# Patient Record
Sex: FEMALE | Race: WHITE | NOT HISPANIC OR LATINO | Employment: FULL TIME | URBAN - METROPOLITAN AREA
[De-identification: names, ages, dates, MRNs, and addresses within clinical notes are randomized per-mention and may not be internally consistent; named-entity substitution may affect disease eponyms.]

---

## 2021-06-25 DIAGNOSIS — Z11.59 ENCOUNTER FOR SCREENING FOR OTHER VIRAL DISEASES: ICD-10-CM

## 2021-06-26 ENCOUNTER — HOSPITAL ENCOUNTER (OUTPATIENT)
Dept: LAB | Facility: CLINIC | Age: 66
Discharge: HOME | End: 2021-06-26
Attending: COLON & RECTAL SURGERY | Admitting: COLON & RECTAL SURGERY
Payer: COMMERCIAL

## 2021-06-26 DIAGNOSIS — Z11.59 ENCOUNTER FOR SCREENING FOR OTHER VIRAL DISEASES: ICD-10-CM

## 2021-06-26 LAB
SARS-COV-2 RNA RESP QL NAA+PROBE: NORMAL
SPECIMEN SOURCE: NORMAL

## 2021-06-26 PROCEDURE — U0003 INFECTIOUS AGENT DETECTION BY NUCLEIC ACID (DNA OR RNA); SEVERE ACUTE RESPIRATORY SYNDROME CORONAVIRUS 2 (SARS-COV-2) (CORONAVIRUS DISEASE [COVID-19]), AMPLIFIED PROBE TECHNIQUE, MAKING USE OF HIGH THROUGHPUT TECHNOLOGIES AS DESCRIBED BY CMS-2020-01-R: HCPCS | Performed by: COLON & RECTAL SURGERY

## 2021-06-26 PROCEDURE — U0005 INFEC AGEN DETEC AMPLI PROBE: HCPCS | Performed by: COLON & RECTAL SURGERY

## 2021-06-27 LAB
LABORATORY COMMENT REPORT: NORMAL
SARS-COV-2 RNA RESP QL NAA+PROBE: NEGATIVE
SPECIMEN SOURCE: NORMAL

## 2021-06-30 ENCOUNTER — HOSPITAL ENCOUNTER (OUTPATIENT)
Facility: CLINIC | Age: 66
Discharge: HOME | End: 2021-06-30
Attending: COLON & RECTAL SURGERY | Admitting: COLON & RECTAL SURGERY
Payer: COMMERCIAL

## 2021-06-30 NOTE — CONSULTS
[facility] Consultation by Colon and Rectal Surgery    Linda Pickard MRN# [id number]   Age: 65 year old YOB: 1955     Date of Admission:  6/30/2021    Reason for consult: Anal pain, hemorrhoids       Requesting physician: LUIS       Level of consult: One-time consult to assist in determining a diagnosis and to recommend an appropriate treatment plan           Assessment and Plan:   Assessment:   Resolving Thrombosed External Hemorrhoid      Plan:   66 y/o woman with a resolving right posterolateral thrombosed external hemorrhoid.  We discussed the etiology of hemorrhoids and their medical and surgical management.  Unfortunately what Linda has is not amenable to hemorrhoid banding.  We are only able to band internal hemorrhoids, not external.  I did not even do an internal exam today because the likelihood of the internal hemorrhoids causing pain is low - this is almost certainly the JESSICA.  I told her I actually do not recommend anything interventionally until the JESSICA has resolved as it can then makes it difficult to discern post-intervention issues if they were to arise.  The only intervention I would have to offer Linda at this point is surgery to excise the hemorrhoid, but that would essentially just restart her cycle of pain.  I told her that this would resolve with time, but it could take weeks.  I encouraged her to be on a high fiber diet, avoid constipation/diarrhea and use her topical ointments that she has been previously prescribed.  She does not need to follow up again unless she has further questions/concerns.  I also told her if she were to follow up to see one of my partners that actually practices here in the [facility] so she would have better continuity of care.             Chief Complaint:   Anal Pain     History is obtained from the patient         History of Present Illness:    65 year old woman presenting with perianal pain for the past month.  She reports that the  pain started when she had a bout of constipation when some of her diabetes meds were changed.  She has not been back on metformin and the stools are looser now.  During that bout of constipation she had a lot of pain and felt a swelling/lump near her anus.  It burns and bleeds.  It is sensitive to the touch when she wipes after a bowel movement.  She has seen her primary and GI and has been told that she would be a good candidate for hemorrhoid banding.             Review of Systems:   The Review of Systems is negative other than noted in the HPI          Physical Exam:   Vitals were reviewed  All vitals stable  Constitutional:   A+Ox3, NAD   Rectal: Resolving JESSICA in right posterolateral quadrant.  No fluctuance.  Tender to touch.  Anoscopic exam deferred due to JESSICA       Attestation:  Amount of time performed on this consult: 30 minutes.    Osiel Lyman MD ....................  6/30/2021   3:18 PM  Colon and Rectal Surgery Staff  [phone]

## 2021-06-30 NOTE — OR NURSING
Patient tolerated office exam visit by . Unable to do banding procedure at this time. See provider note for further detail.

## 2022-02-20 ENCOUNTER — APPOINTMENT (OUTPATIENT)
Dept: ULTRASOUND IMAGING | Facility: CLINIC | Age: 67
End: 2022-02-20
Attending: EMERGENCY MEDICINE
Payer: COMMERCIAL

## 2022-02-20 ENCOUNTER — HOSPITAL ENCOUNTER (EMERGENCY)
Facility: CLINIC | Age: 67
Discharge: HOME | End: 2022-02-20
Attending: EMERGENCY MEDICINE | Admitting: EMERGENCY MEDICINE
Payer: COMMERCIAL

## 2022-02-20 VITALS
HEIGHT: 60 IN | TEMPERATURE: 98.4 F | RESPIRATION RATE: 16 BRPM | HEART RATE: 77 BPM | OXYGEN SATURATION: 99 % | WEIGHT: 165 LBS | DIASTOLIC BLOOD PRESSURE: 69 MMHG | SYSTOLIC BLOOD PRESSURE: 123 MMHG | BODY MASS INDEX: 32.39 KG/M2

## 2022-02-20 DIAGNOSIS — L02.619 CELLULITIS AND ABSCESS OF FOOT EXCLUDING TOE: ICD-10-CM

## 2022-02-20 DIAGNOSIS — L03.119 CELLULITIS AND ABSCESS OF FOOT EXCLUDING TOE: ICD-10-CM

## 2022-02-20 DIAGNOSIS — R60.0 BILATERAL EDEMA OF LOWER EXTREMITY: ICD-10-CM

## 2022-02-20 LAB
ALBUMIN SERPL-MCNC: 3.2 G/DL (ref 3.4–5)
ALP SERPL-CCNC: 114 U/L (ref 40–150)
ALT SERPL W P-5'-P-CCNC: 25 U/L (ref 0–50)
ANION GAP SERPL CALCULATED.3IONS-SCNC: 4 MMOL/L (ref 3–14)
AST SERPL W P-5'-P-CCNC: 16 U/L (ref 0–45)
BASOPHILS # BLD AUTO: 0.1 10E3/UL (ref 0–0.2)
BASOPHILS NFR BLD AUTO: 1 %
BILIRUB SERPL-MCNC: 0.3 MG/DL (ref 0.2–1.3)
BUN SERPL-MCNC: 35 MG/DL (ref 7–30)
CALCIUM SERPL-MCNC: 10.3 MG/DL (ref 8.5–10.1)
CHLORIDE BLD-SCNC: 101 MMOL/L (ref 94–109)
CO2 SERPL-SCNC: 30 MMOL/L (ref 20–32)
CREAT SERPL-MCNC: 1.1 MG/DL (ref 0.52–1.04)
D DIMER PPP FEU-MCNC: 2.23 UG/ML FEU (ref 0–0.5)
EOSINOPHIL # BLD AUTO: 0.6 10E3/UL (ref 0–0.7)
EOSINOPHIL NFR BLD AUTO: 6 %
ERYTHROCYTE [DISTWIDTH] IN BLOOD BY AUTOMATED COUNT: 16.2 % (ref 10–15)
GFR SERPL CREATININE-BSD FRML MDRD: 56 ML/MIN/1.73M2
GLUCOSE BLD-MCNC: 236 MG/DL (ref 70–99)
HCT VFR BLD AUTO: 34.8 % (ref 35–47)
HGB BLD-MCNC: 10.4 G/DL (ref 11.7–15.7)
IMM GRANULOCYTES # BLD: 0.1 10E3/UL
IMM GRANULOCYTES NFR BLD: 1 %
LYMPHOCYTES # BLD AUTO: 2.2 10E3/UL (ref 0.8–5.3)
LYMPHOCYTES NFR BLD AUTO: 20 %
MCH RBC QN AUTO: 23.7 PG (ref 26.5–33)
MCHC RBC AUTO-ENTMCNC: 29.9 G/DL (ref 31.5–36.5)
MCV RBC AUTO: 79 FL (ref 78–100)
MONOCYTES # BLD AUTO: 0.8 10E3/UL (ref 0–1.3)
MONOCYTES NFR BLD AUTO: 7 %
NEUTROPHILS # BLD AUTO: 7.3 10E3/UL (ref 1.6–8.3)
NEUTROPHILS NFR BLD AUTO: 65 %
NRBC # BLD AUTO: 0 10E3/UL
NRBC BLD AUTO-RTO: 0 /100
NT-PROBNP SERPL-MCNC: 34 PG/ML (ref 0–900)
PLATELET # BLD AUTO: 245 10E3/UL (ref 150–450)
POTASSIUM BLD-SCNC: 4.6 MMOL/L (ref 3.4–5.3)
PROT SERPL-MCNC: 6.6 G/DL (ref 6.8–8.8)
RBC # BLD AUTO: 4.39 10E6/UL (ref 3.8–5.2)
SODIUM SERPL-SCNC: 135 MMOL/L (ref 133–144)
WBC # BLD AUTO: 11 10E3/UL (ref 4–11)

## 2022-02-20 PROCEDURE — 85379 FIBRIN DEGRADATION QUANT: CPT | Performed by: EMERGENCY MEDICINE

## 2022-02-20 PROCEDURE — 83880 ASSAY OF NATRIURETIC PEPTIDE: CPT | Performed by: EMERGENCY MEDICINE

## 2022-02-20 PROCEDURE — 96374 THER/PROPH/DIAG INJ IV PUSH: CPT

## 2022-02-20 PROCEDURE — 80053 COMPREHEN METABOLIC PANEL: CPT | Performed by: EMERGENCY MEDICINE

## 2022-02-20 PROCEDURE — 99285 EMERGENCY DEPT VISIT HI MDM: CPT | Mod: 25

## 2022-02-20 PROCEDURE — 250N000011 HC RX IP 250 OP 636: Performed by: EMERGENCY MEDICINE

## 2022-02-20 PROCEDURE — 250N000013 HC RX MED GY IP 250 OP 250 PS 637: Performed by: EMERGENCY MEDICINE

## 2022-02-20 PROCEDURE — 85004 AUTOMATED DIFF WBC COUNT: CPT | Performed by: EMERGENCY MEDICINE

## 2022-02-20 PROCEDURE — 93971 EXTREMITY STUDY: CPT

## 2022-02-20 PROCEDURE — 36415 COLL VENOUS BLD VENIPUNCTURE: CPT | Performed by: EMERGENCY MEDICINE

## 2022-02-20 RX ORDER — RIVAROXABAN 15 MG-20MG
KIT ORAL
Qty: 1 EACH | Refills: 0 | Status: SHIPPED | OUTPATIENT
Start: 2022-02-20 | End: 2023-10-31

## 2022-02-20 RX ORDER — ONDANSETRON 2 MG/ML
4 INJECTION INTRAMUSCULAR; INTRAVENOUS ONCE
Status: COMPLETED | OUTPATIENT
Start: 2022-02-20 | End: 2022-02-20

## 2022-02-20 RX ORDER — FUROSEMIDE 20 MG
20 TABLET ORAL 2 TIMES DAILY
Qty: 30 TABLET | Refills: 0 | Status: SHIPPED | OUTPATIENT
Start: 2022-02-20 | End: 2023-10-31

## 2022-02-20 RX ORDER — CEPHALEXIN 500 MG/1
500 CAPSULE ORAL 3 TIMES DAILY
Qty: 40 CAPSULE | Refills: 0 | Status: SHIPPED | OUTPATIENT
Start: 2022-02-20 | End: 2022-03-02

## 2022-02-20 RX ORDER — RIVAROXABAN 15 MG-20MG
KIT ORAL
Qty: 1 EACH | Refills: 0 | Status: SHIPPED | OUTPATIENT
Start: 2022-02-20 | End: 2022-02-20

## 2022-02-20 RX ADMIN — ONDANSETRON 4 MG: 2 INJECTION INTRAMUSCULAR; INTRAVENOUS at 14:19

## 2022-02-20 RX ADMIN — APIXABAN 10 MG: 5 TABLET, FILM COATED ORAL at 16:25

## 2022-02-20 ASSESSMENT — ENCOUNTER SYMPTOMS
SHORTNESS OF BREATH: 0
FEVER: 0

## 2022-02-20 NOTE — ED NOTES
Wounds wrapped with meplex, abd pads and krilex as well ace wraps. Education about ace wraps discussed with pt. All questions answered.

## 2022-02-20 NOTE — ED PROVIDER NOTES
History   Chief Complaint:  Wound Check and Foot Pain     The history is provided by the patient.      Linda Pickard is a 66 year old female with a history of using a power chair and has type 2 diabetes who presents with her  for a wound check and foot pain. Per Chart Review, within the last month, she has been seeing Dr. Landon for bilateral lower extremity edema. Today, she arrives from the [facility] for her edema. Per the patient's report, she has had the red, blistering edema for about a month. Alongside that, she has leg swelling in both legs. She's been taking a furosemide within the last few weeks for it. She states to have had some testing done on her, stating her kidney's normal. The patient denies having any shortness of breath or fevers.    Review of Systems   Constitutional: Negative for fever.   Respiratory: Negative for shortness of breath.    Cardiovascular: Positive for leg swelling (both legs).   Skin: Negative for rash (both legs, red, raised).   All other systems reviewed and are negative.    Allergies:  Cats (Fur, Dander, Saliva)  House Dust  Homeopathic Products    Medications:  pregabalin  glipiZIDE   lisinopril-hydrochlorothiazide   furosemide     Past Medical History:     Fibromyalgia  Reactive airway disease  Lumbar spinal stenosis  Vertigo  Elevated blood pressure  Prolonged Q-T interval on ECG  Peripheral neuropathy, idiopathic  Type 2 diabetes mellitus  Ganglion cyst of wrist, right  DJD (degenerative joint disease) of knee  DJD (degenerative joint disease) of hip  Foot drop  Anesthesia complication  Mobility impaired  Arthritis  Radiculopathy  Borderline diabetes  Elevated cholesterol    Past Surgical History:    Birth jennie removal  Appendectomy   section  Cholecystectomy   Arthroscope  L4 steriod injection  Tubal ligation  Left toal hip arthroplasty  Lumbar laminectomy (x2)  Colonoscopy  [place] teeth extraction  Excise right wrist volar ganglion  cyst  Knee replacement    Family History:    Blood Disease - Father  Cancer - Father     Diabetes - Father       Multiple Myeloma - Father           Diabetes - Mother       Heart Disease - Mother       Social History:  The patient presents with her .  The patient is .    Physical Exam     Patient Vitals for the past 24 hrs:   BP Temp Temp src Pulse Resp SpO2 Height Weight   02/20/22 1137 138/79 98.4  F (36.9  C) Oral 84 16 98 % 1.524 m (5') 74.8 kg (165 lb)     Physical Exam  Vitals and nursing note reviewed.   Constitutional:       Appearance: She is obese.   HENT:      Head: Normocephalic.      Right Ear: Tympanic membrane normal.      Left Ear: Tympanic membrane normal.   Cardiovascular:      Rate and Rhythm: Regular rhythm.   Pulmonary:      Effort: Pulmonary effort is normal.      Breath sounds: Normal breath sounds.   Abdominal:      General: Abdomen is flat.      Palpations: Abdomen is soft.   Musculoskeletal:         General: Normal range of motion.   Skin:     Comments: There is bilateral symmetric edema and erythema of the legs.  On the left foot as imaged there is an oval open ulcer weeping some small amount of fluid.  There is some slight erythema in this region.  There are decreased DP pulses this is secondary to likely edema.  There is normal capillary refill bilaterally.  Patient is disabled and shows no signs of contractures edema is up to the mid thighs.   Neurological:      General: No focal deficit present.      Mental Status: She is alert and oriented to person, place, and time.   Psychiatric:         Mood and Affect: Mood normal.                   Emergency Department Course     Laboratory:  Labs Ordered and Resulted from Time of ED Arrival to Time of ED Departure   COMPREHENSIVE METABOLIC PANEL - Abnormal       Result Value    Sodium 135      Potassium 4.6      Chloride 101      Carbon Dioxide (CO2) 30      Anion Gap 4      Urea Nitrogen 35 (*)     Creatinine 1.10 (*)     Calcium  10.3 (*)     Glucose 236 (*)     Alkaline Phosphatase 114      AST 16      ALT 25      Protein Total 6.6 (*)     Albumin 3.2 (*)     Bilirubin Total 0.3      GFR Estimate 56 (*)    D DIMER QUANTITATIVE - Abnormal    D-Dimer Quantitative 2.23 (*)    CBC WITH PLATELETS AND DIFFERENTIAL - Abnormal    WBC Count 11.0      RBC Count 4.39      Hemoglobin 10.4 (*)     Hematocrit 34.8 (*)     MCV 79      MCH 23.7 (*)     MCHC 29.9 (*)     RDW 16.2 (*)     Platelet Count 245      % Neutrophils 65      % Lymphocytes 20      % Monocytes 7      % Eosinophils 6      % Basophils 1      % Immature Granulocytes 1      NRBCs per 100 WBC 0      Absolute Neutrophils 7.3      Absolute Lymphocytes 2.2      Absolute Monocytes 0.8      Absolute Eosinophils 0.6      Absolute Basophils 0.1      Absolute Immature Granulocytes 0.1      Absolute NRBCs 0.0     NT PROBNP INPATIENT - Normal    N terminal Pro BNP Inpatient 34         Emergency Department Course:         Reviewed:  I reviewed nursing notes, vitals, past medical history, Care Everywhere and MIIC    Assessments:  1224 I obtained history and examined the patient as noted above.   1359 I rechecked the patient and explained findings.    Disposition:  The patient was discharged to home.     Impression & Plan     CMS Diagnoses: None.    Medical Decision Making:  Patient presents with bilateral lower extremity edema.  This is already in been treated with 20 of Lasix daily.  Patient continues to get worse with blistering of the skin is likely secondary to compression patient is unable to place compression socks due to the size of her legs.  She is had no chest pain or shortness of breath.  She is had an outpatient echo that showed normal left ventricular function.  Suspect dependent edema patient is sits in the chair at night.  She keeps her legs below her heart.  She has no compression stockings or elastic on her legs.  Due to her immobile status DVT was considered D-dimer was elevated and  therefore ultrasounds were performed there is evidence of nonocclusive DVT on the right but due to her immobile state she is high risk for DVT and recommend treatment of nonocclusive DVT.  Patient's GFR is 56 therefore novel anticoagulation is indicated this was discussed with the patient patient's lower extremity edema is multifactorial suspect dependent edema secondary to nonmotion of the legs there is mild evidence for cellulitis due to her worsening and worsening edema with diuresis we will recommend antibiotics with compression using Ace wraps and follow-up with wound clinic for diabetic foot ulcer.  Minimally infected.  Patient is asked to return with fever shortness of breath or worsening condition and was asked to follow-up with her primary doctor discuss refills and ongoing anticoagulation for nonocclusive DVT.  Patient was discharged in stable condition.    Diagnosis:    ICD-10-CM    1. Bilateral edema of lower extremity  R60.0 Wound Care Referral   2. Cellulitis and abscess of foot excluding toe  L03.119 Wound Care Referral    L02.619        Discharge Medications:  Discharge Medication List as of 2/20/2022  4:19 PM      START taking these medications    Details   cephALEXin (KEFLEX) 500 MG capsule Take 1 capsule (500 mg) by mouth 3 times daily for 10 days, Disp-40 capsule, R-0, Local Print      furosemide (LASIX) 20 MG tablet Take 1 tablet (20 mg) by mouth 2 times daily, Disp-30 tablet, R-0, Local Print      !! apixaban ANTICOAGULANT (ELIQUIS) 5 MG tablet Take 2 tablets (10 mg) by mouth 2 times daily, Disp-14 tablet, R-0, Local Print      !! apixaban ANTICOAGULANT (ELIQUIS) 5 MG tablet Take 1 tablet (5 mg) by mouth 2 times daily for 20 days, Disp-40 tablet, R-0, Local Print       !! - Potential duplicate medications found. Please discuss with provider.        Scribe Disclosure:  THAO, Luli Trammell, am serving as a scribe at 12:36 PM on 2/20/2022 to document services personally performed by Demetrius Carrasquillo  MD Arnaldo based on my observations and the provider's statements to me.     Demetrius Carrasquillo MD  02/21/22 0767

## 2022-02-20 NOTE — ED TRIAGE NOTES
Patient presents from [facility] urgent care with bilateral foot pain and concerns for infection. States her feet have been red and blistered for the last month. Denies fevers at home. Hx DM, diabetic neuropathy and radiculopathy.

## 2022-02-20 NOTE — DISCHARGE INSTRUCTIONS
We are initiating antibiotics due to draining wound on the top of the left foot.  Please use Ace wrap to help with compression please elevate your legs to help with edema when able.  Complete course of antibiotics return with fever greater than 100.4 or rapid increase in redness or swelling of the legs.  Please follow-up with wound clinic or primary care to discuss further treatment of lower extremity edema.

## 2022-02-20 NOTE — ED PROVIDER NOTES
Pharmacy called regarding the patient's prescriptions.  Keflex was prescribed 1 tablet 3 times daily x10 days but the quantity of 40 was prescribed.  I confirm with the pharmacy that quantity dispensed should be 30 which would equal 1 tablet 3 times daily x10 days.  In addition the patient's insurance would not cover Eliquis but would cover Xarelto therefore the anticoagulant was changed to Xarelto.     Ganga Gill MD  02/20/22 4746

## 2022-02-21 ENCOUNTER — TELEPHONE (OUTPATIENT)
Dept: WOUND CARE | Facility: CLINIC | Age: 67
End: 2022-02-21

## 2022-02-21 NOTE — TELEPHONE ENCOUNTER
Consult received via workqueue from provider Demetrius Carrasquillo MD for Venous leg/foot ulcer.     Per Standing order Patient qualifies for TRENA to be scheduled prior to assessment with Katie Wynne, or Andria CATES PA-C at [facility] [place] at [facility].    Routing to  Ute Lobo.

## 2022-02-22 NOTE — TELEPHONE ENCOUNTER
Patient declined to schedule, states that she will be moving forward with care at [facility].     Routing to wound RN as FYI.       Ute Lobo    [facility]   [phone]

## 2023-10-31 ENCOUNTER — HOSPITAL ENCOUNTER (INPATIENT)
Facility: CLINIC | Age: 68
LOS: 1 days | Discharge: HOME | DRG: 682 | End: 2023-11-01
Attending: EMERGENCY MEDICINE | Admitting: HOSPITALIST
Payer: COMMERCIAL

## 2023-10-31 ENCOUNTER — APPOINTMENT (OUTPATIENT)
Dept: CT IMAGING | Facility: CLINIC | Age: 68
DRG: 682 | End: 2023-10-31
Attending: EMERGENCY MEDICINE
Payer: COMMERCIAL

## 2023-10-31 DIAGNOSIS — N17.9 ACUTE RENAL FAILURE, UNSPECIFIED ACUTE RENAL FAILURE TYPE (H): Primary | ICD-10-CM

## 2023-10-31 DIAGNOSIS — K62.5 BRIGHT RED BLOOD PER RECTUM: ICD-10-CM

## 2023-10-31 DIAGNOSIS — G93.49 UREMIC ENCEPHALOPATHY: ICD-10-CM

## 2023-10-31 DIAGNOSIS — N19 UREMIC ENCEPHALOPATHY: ICD-10-CM

## 2023-10-31 LAB
ABO/RH(D): NORMAL
ALBUMIN SERPL BCG-MCNC: 3.8 G/DL (ref 3.5–5.2)
ALBUMIN UR-MCNC: NEGATIVE MG/DL
ALP SERPL-CCNC: 85 U/L (ref 35–104)
ALT SERPL W P-5'-P-CCNC: 13 U/L (ref 0–50)
ANION GAP SERPL CALCULATED.3IONS-SCNC: 11 MMOL/L (ref 7–15)
ANION GAP SERPL CALCULATED.3IONS-SCNC: 14 MMOL/L (ref 7–15)
ANTIBODY SCREEN: NEGATIVE
APPEARANCE UR: CLEAR
AST SERPL W P-5'-P-CCNC: 7 U/L (ref 0–45)
BASOPHILS # BLD AUTO: 0.1 10E3/UL (ref 0–0.2)
BASOPHILS NFR BLD AUTO: 1 %
BILIRUB SERPL-MCNC: 0.2 MG/DL
BILIRUB UR QL STRIP: NEGATIVE
BUN SERPL-MCNC: 104.5 MG/DL (ref 8–23)
BUN SERPL-MCNC: 85.5 MG/DL (ref 8–23)
CALCIUM SERPL-MCNC: 9.1 MG/DL (ref 8.8–10.2)
CALCIUM SERPL-MCNC: 9.2 MG/DL (ref 8.8–10.2)
CHLORIDE SERPL-SCNC: 101 MMOL/L (ref 98–107)
CHLORIDE SERPL-SCNC: 99 MMOL/L (ref 98–107)
COLOR UR AUTO: ABNORMAL
CREAT SERPL-MCNC: 1.99 MG/DL (ref 0.51–0.95)
CREAT SERPL-MCNC: 2.86 MG/DL (ref 0.51–0.95)
CREAT UR-MCNC: 69 MG/DL
DEPRECATED HCO3 PLAS-SCNC: 21 MMOL/L (ref 22–29)
DEPRECATED HCO3 PLAS-SCNC: 21 MMOL/L (ref 22–29)
EGFRCR SERPLBLD CKD-EPI 2021: 18 ML/MIN/1.73M2
EGFRCR SERPLBLD CKD-EPI 2021: 27 ML/MIN/1.73M2
EOSINOPHIL # BLD AUTO: 0.5 10E3/UL (ref 0–0.7)
EOSINOPHIL NFR BLD AUTO: 4 %
ERYTHROCYTE [DISTWIDTH] IN BLOOD BY AUTOMATED COUNT: 15.4 % (ref 10–15)
ETHANOL SERPL-MCNC: <0.01 G/DL
FERRITIN SERPL-MCNC: 98 NG/ML (ref 11–328)
FRACT EXCRET NA UR+SERPL-RTO: 1.2 %
GLUCOSE BLDC GLUCOMTR-MCNC: 123 MG/DL (ref 70–99)
GLUCOSE BLDC GLUCOMTR-MCNC: 138 MG/DL (ref 70–99)
GLUCOSE BLDC GLUCOMTR-MCNC: 162 MG/DL (ref 70–99)
GLUCOSE SERPL-MCNC: 133 MG/DL (ref 70–99)
GLUCOSE SERPL-MCNC: 169 MG/DL (ref 70–99)
GLUCOSE UR STRIP-MCNC: NEGATIVE MG/DL
HBA1C MFR BLD: 6.2 %
HCT VFR BLD AUTO: 34.2 % (ref 35–47)
HGB BLD-MCNC: 10.5 G/DL (ref 11.7–15.7)
HGB UR QL STRIP: NEGATIVE
HYALINE CASTS: 14 /LPF
IMM GRANULOCYTES # BLD: 0.1 10E3/UL
IMM GRANULOCYTES NFR BLD: 1 %
IRON BINDING CAPACITY (ROCHE): 289 UG/DL (ref 240–430)
IRON SATN MFR SERPL: 13 % (ref 15–46)
IRON SERPL-MCNC: 38 UG/DL (ref 37–145)
KETONES UR STRIP-MCNC: NEGATIVE MG/DL
LEUKOCYTE ESTERASE UR QL STRIP: NEGATIVE
LYMPHOCYTES # BLD AUTO: 3.4 10E3/UL (ref 0.8–5.3)
LYMPHOCYTES NFR BLD AUTO: 28 %
MCH RBC QN AUTO: 28.3 PG (ref 26.5–33)
MCHC RBC AUTO-ENTMCNC: 30.7 G/DL (ref 31.5–36.5)
MCV RBC AUTO: 92 FL (ref 78–100)
MONOCYTES # BLD AUTO: 0.8 10E3/UL (ref 0–1.3)
MONOCYTES NFR BLD AUTO: 7 %
MUCOUS THREADS #/AREA URNS LPF: PRESENT /LPF
NEUTROPHILS # BLD AUTO: 7.1 10E3/UL (ref 1.6–8.3)
NEUTROPHILS NFR BLD AUTO: 59 %
NITRATE UR QL: NEGATIVE
NRBC # BLD AUTO: 0 10E3/UL
NRBC BLD AUTO-RTO: 0 /100
PH UR STRIP: 6 [PH] (ref 5–7)
PHOSPHATE SERPL-MCNC: 5.6 MG/DL (ref 2.5–4.5)
PLATELET # BLD AUTO: 233 10E3/UL (ref 150–450)
POTASSIUM SERPL-SCNC: 4.7 MMOL/L (ref 3.4–5.3)
POTASSIUM SERPL-SCNC: 5.4 MMOL/L (ref 3.4–5.3)
PROT SERPL-MCNC: 6.2 G/DL (ref 6.4–8.3)
RBC # BLD AUTO: 3.71 10E6/UL (ref 3.8–5.2)
RBC URINE: <1 /HPF
SODIUM SERPL-SCNC: 133 MMOL/L (ref 135–145)
SODIUM SERPL-SCNC: 134 MMOL/L (ref 135–145)
SODIUM UR-SCNC: 39 MMOL/L
SP GR UR STRIP: 1.01 (ref 1–1.03)
SPECIMEN EXPIRATION DATE: NORMAL
SQUAMOUS EPITHELIAL: 8 /HPF
UROBILINOGEN UR STRIP-MCNC: NORMAL MG/DL
WBC # BLD AUTO: 11.9 10E3/UL (ref 4–11)
WBC URINE: 1 /HPF

## 2023-10-31 PROCEDURE — 258N000003 HC RX IP 258 OP 636: Performed by: EMERGENCY MEDICINE

## 2023-10-31 PROCEDURE — 82728 ASSAY OF FERRITIN: CPT | Performed by: HOSPITALIST

## 2023-10-31 PROCEDURE — 84300 ASSAY OF URINE SODIUM: CPT | Performed by: HOSPITALIST

## 2023-10-31 PROCEDURE — 86850 RBC ANTIBODY SCREEN: CPT | Performed by: EMERGENCY MEDICINE

## 2023-10-31 PROCEDURE — 80053 COMPREHEN METABOLIC PANEL: CPT | Performed by: EMERGENCY MEDICINE

## 2023-10-31 PROCEDURE — 36415 COLL VENOUS BLD VENIPUNCTURE: CPT | Performed by: INTERNAL MEDICINE

## 2023-10-31 PROCEDURE — 74176 CT ABD & PELVIS W/O CONTRAST: CPT

## 2023-10-31 PROCEDURE — 120N000001 HC R&B MED SURG/OB

## 2023-10-31 PROCEDURE — 250N000013 HC RX MED GY IP 250 OP 250 PS 637: Performed by: HOSPITALIST

## 2023-10-31 PROCEDURE — 99223 1ST HOSP IP/OBS HIGH 75: CPT | Performed by: HOSPITALIST

## 2023-10-31 PROCEDURE — 258N000003 HC RX IP 258 OP 636: Performed by: HOSPITALIST

## 2023-10-31 PROCEDURE — 82310 ASSAY OF CALCIUM: CPT | Performed by: INTERNAL MEDICINE

## 2023-10-31 PROCEDURE — 36415 COLL VENOUS BLD VENIPUNCTURE: CPT | Performed by: EMERGENCY MEDICINE

## 2023-10-31 PROCEDURE — 82962 GLUCOSE BLOOD TEST: CPT

## 2023-10-31 PROCEDURE — 83550 IRON BINDING TEST: CPT | Performed by: HOSPITALIST

## 2023-10-31 PROCEDURE — 86901 BLOOD TYPING SEROLOGIC RH(D): CPT | Performed by: EMERGENCY MEDICINE

## 2023-10-31 PROCEDURE — 82077 ASSAY SPEC XCP UR&BREATH IA: CPT | Performed by: EMERGENCY MEDICINE

## 2023-10-31 PROCEDURE — 81001 URINALYSIS AUTO W/SCOPE: CPT | Performed by: EMERGENCY MEDICINE

## 2023-10-31 PROCEDURE — 85025 COMPLETE CBC W/AUTO DIFF WBC: CPT | Performed by: EMERGENCY MEDICINE

## 2023-10-31 PROCEDURE — 99285 EMERGENCY DEPT VISIT HI MDM: CPT | Mod: 25

## 2023-10-31 PROCEDURE — 83036 HEMOGLOBIN GLYCOSYLATED A1C: CPT | Performed by: HOSPITALIST

## 2023-10-31 PROCEDURE — 96360 HYDRATION IV INFUSION INIT: CPT

## 2023-10-31 PROCEDURE — 84100 ASSAY OF PHOSPHORUS: CPT | Performed by: HOSPITALIST

## 2023-10-31 RX ORDER — CHOLECALCIFEROL (VITAMIN D3) 50 MCG
1 TABLET ORAL DAILY
COMMUNITY

## 2023-10-31 RX ORDER — ATORVASTATIN CALCIUM 40 MG/1
40 TABLET, FILM COATED ORAL AT BEDTIME
COMMUNITY

## 2023-10-31 RX ORDER — FUROSEMIDE 20 MG
20 TABLET ORAL
Status: ON HOLD | COMMUNITY
End: 2023-11-01

## 2023-10-31 RX ORDER — BISACODYL 10 MG
10 SUPPOSITORY, RECTAL RECTAL DAILY PRN
Status: DISCONTINUED | OUTPATIENT
Start: 2023-10-31 | End: 2023-11-01 | Stop reason: HOSPADM

## 2023-10-31 RX ORDER — ACETAMINOPHEN 650 MG/1
650 SUPPOSITORY RECTAL EVERY 6 HOURS PRN
Status: DISCONTINUED | OUTPATIENT
Start: 2023-10-31 | End: 2023-11-01 | Stop reason: HOSPADM

## 2023-10-31 RX ORDER — DULOXETIN HYDROCHLORIDE 60 MG/1
120 CAPSULE, DELAYED RELEASE ORAL DAILY
COMMUNITY

## 2023-10-31 RX ORDER — SODIUM CHLORIDE 9 MG/ML
INJECTION, SOLUTION INTRAVENOUS CONTINUOUS
Status: DISCONTINUED | OUTPATIENT
Start: 2023-10-31 | End: 2023-11-01 | Stop reason: HOSPADM

## 2023-10-31 RX ORDER — ACETAMINOPHEN 500 MG
1000 TABLET ORAL EVERY 6 HOURS PRN
COMMUNITY

## 2023-10-31 RX ORDER — PREGABALIN 100 MG/1
100 CAPSULE ORAL 2 TIMES DAILY
COMMUNITY

## 2023-10-31 RX ORDER — AMOXICILLIN 250 MG
1 CAPSULE ORAL 2 TIMES DAILY PRN
Status: DISCONTINUED | OUTPATIENT
Start: 2023-10-31 | End: 2023-11-01 | Stop reason: HOSPADM

## 2023-10-31 RX ORDER — ONDANSETRON 4 MG/1
4 TABLET, ORALLY DISINTEGRATING ORAL EVERY 6 HOURS PRN
Status: DISCONTINUED | OUTPATIENT
Start: 2023-10-31 | End: 2023-11-01 | Stop reason: HOSPADM

## 2023-10-31 RX ORDER — ACETAMINOPHEN 325 MG/1
650 TABLET ORAL EVERY 6 HOURS PRN
Status: DISCONTINUED | OUTPATIENT
Start: 2023-10-31 | End: 2023-11-01 | Stop reason: HOSPADM

## 2023-10-31 RX ORDER — AMOXICILLIN 250 MG
2 CAPSULE ORAL 2 TIMES DAILY PRN
Status: DISCONTINUED | OUTPATIENT
Start: 2023-10-31 | End: 2023-11-01 | Stop reason: HOSPADM

## 2023-10-31 RX ORDER — DEXTROSE MONOHYDRATE 25 G/50ML
25-50 INJECTION, SOLUTION INTRAVENOUS
Status: DISCONTINUED | OUTPATIENT
Start: 2023-10-31 | End: 2023-11-01 | Stop reason: HOSPADM

## 2023-10-31 RX ORDER — GLIPIZIDE 10 MG/1
20 TABLET, FILM COATED, EXTENDED RELEASE ORAL DAILY
Status: ON HOLD | COMMUNITY
End: 2023-11-01

## 2023-10-31 RX ORDER — LISINOPRIL/HYDROCHLOROTHIAZIDE 10-12.5 MG
1 TABLET ORAL DAILY
Status: ON HOLD | COMMUNITY
End: 2023-11-01

## 2023-10-31 RX ORDER — ONDANSETRON 2 MG/ML
4 INJECTION INTRAMUSCULAR; INTRAVENOUS EVERY 6 HOURS PRN
Status: DISCONTINUED | OUTPATIENT
Start: 2023-10-31 | End: 2023-11-01 | Stop reason: HOSPADM

## 2023-10-31 RX ORDER — CYCLOBENZAPRINE HCL 5 MG
5-10 TABLET ORAL
COMMUNITY

## 2023-10-31 RX ORDER — MECLIZINE HYDROCHLORIDE 25 MG/1
25 TABLET ORAL 3 TIMES DAILY PRN
COMMUNITY

## 2023-10-31 RX ORDER — NICOTINE POLACRILEX 4 MG
15-30 LOZENGE BUCCAL
Status: DISCONTINUED | OUTPATIENT
Start: 2023-10-31 | End: 2023-11-01 | Stop reason: HOSPADM

## 2023-10-31 RX ORDER — FLUTICASONE PROPIONATE 50 MCG
1 SPRAY, SUSPENSION (ML) NASAL DAILY
COMMUNITY

## 2023-10-31 RX ORDER — NAPROXEN 500 MG/1
500 TABLET ORAL 2 TIMES DAILY WITH MEALS
Status: ON HOLD | COMMUNITY
End: 2023-11-01

## 2023-10-31 RX ORDER — ALBUTEROL SULFATE 90 UG/1
2 AEROSOL, METERED RESPIRATORY (INHALATION) EVERY 6 HOURS PRN
COMMUNITY

## 2023-10-31 RX ORDER — POLYETHYLENE GLYCOL 3350 17 G/17G
17 POWDER, FOR SOLUTION ORAL DAILY PRN
Status: DISCONTINUED | OUTPATIENT
Start: 2023-10-31 | End: 2023-11-01 | Stop reason: HOSPADM

## 2023-10-31 RX ADMIN — SODIUM CHLORIDE: 900 INJECTION INTRAVENOUS at 06:50

## 2023-10-31 RX ADMIN — ACETAMINOPHEN 650 MG: 325 TABLET, FILM COATED ORAL at 20:37

## 2023-10-31 RX ADMIN — SODIUM CHLORIDE 1000 ML: 9 INJECTION, SOLUTION INTRAVENOUS at 05:22

## 2023-10-31 RX ADMIN — SODIUM CHLORIDE: 900 INJECTION INTRAVENOUS at 19:01

## 2023-10-31 ASSESSMENT — ACTIVITIES OF DAILY LIVING (ADL)
ADLS_ACUITY_SCORE: 38
ADLS_ACUITY_SCORE: 36
ADLS_ACUITY_SCORE: 35
ADLS_ACUITY_SCORE: 38
ADLS_ACUITY_SCORE: 36
ADLS_ACUITY_SCORE: 35
ADLS_ACUITY_SCORE: 36
ADLS_ACUITY_SCORE: 35

## 2023-10-31 NOTE — PROGRESS NOTES
Patient seen and examined, admitted this morning, H&P, labs, medications, imaging reviewed.  I agreed with the plan as outlined by Dr. Sherman Rubi in H&P.  68-year-old female admitted with metabolic encephalopathy, acute renal failure suspected due to dehydration in the setting of decreased oral intake and diuretic, hyperkalemia and lower GI bleeding suspected to be due to hemorrhoid.  -Patient was evaluated by GI.  -Nephrology consulted, awaiting input.  -Started on renal diet.  -Repeat BMP ordered and pending.  -Continue to monitor the patient.  -Continue the same care.  I discussed with patient, with her  and also with her sister at bedside.  All their question and concerns addressed.  I will  continue to follow-up with patient.

## 2023-10-31 NOTE — ED NOTES
[facility]  ED Nurse Handoff Report    ED Chief complaint: Rectal Bleeding  . ED Diagnosis:   Final diagnoses:   Uremic encephalopathy   Bright red blood per rectum       Allergies: No Known Allergies    Code Status: Full Code    Activity level - Baseline/Home:  independent.  Activity Level - Current:   standby.   Lift room needed: No.   Bariatric: No   Needed: No   Isolation: No.   Infection: Not Applicable.     Respiratory status: Room air    Vital Signs (within 30 minutes):   Vitals:    10/31/23 0407 10/31/23 0413   BP:  104/86   Pulse: 76    Temp: 97.6  F (36.4  C)    TempSrc: Oral    SpO2: 96%      Cardiac Rhythm:  ,      Pain level:    Patient confused: No.   Patient Falls Risk: nonskid shoes/slippers when out of bed, arm band in place, and patient and family education.   Elimination Status:  straight cathed for UA      Patient Report - Initial Complaint: Arrived via EMS from home with . Has had bright red stools for the past 2 days. States has a history of blood in stools, but that she has known hemorroids which she says are currently painful 8/10.  told EMS that she has had increased weakness and increase in altered mental status but unable to clarify further what he meant by that.  PIV right AC 18g.     Focused Assessment:    68 year old female with history of type II diabetes, stage 3 CKD, hypertension, and GI bleed who presents via EMS for evaluation of rectal bleeding. Per EMS, the patient has had bright red stools for the past two days. States that she was seen in clinic yesterday, but has had increasing weakness. The patient that she has known hemorrhoids that are painful at an 8/10 on the severity scale. The patient's spouse reports the patient was hypotensive yesterday and he endorses red blood in the patient's stool. Adds that she has been having slurred speech and often does not finish her sentences which is not normal for her. Denies diarrhea and  black stool.     Independent Historian:   EMS - They report as noted above.     Review of External Notes:      Medications:    Naproxen  Lancets  Cyclobenzaprine  Pregabalin  Metformin  Meclizine  Glipizide  Furosemide   Duloxetine  Omeprazole  Lisinopril-hydrochlorothiazide      Past Medical History:    DVT  Stage 3 CKD  Lymphedema  Hypertension  Hemorrhoids  Hammer toes, bilateral  Wheelchair bound  Reactive airway disease   Type II diabetes  Lumbar disc disease  Vascular myelopathy  Neuropathy   Prolonged QT  Vertigo  Fibromyalgia  Osteoarthritis   GI bleed  Cellulitis      Past Surgical History:    Extraocular muscle repair  Birth jennie removal  Appendectomy   section  Cholecystectomy  L4 steroid injection  Tubal ligation  Hip arthroplasty, left  Lumbar laminectomy, L4   Lumbar laminectomy, L5-S1  Knee arthroplasty, left  [place] teeth extraction  Excise ganglion cyst, right     Abnormal Results:   Labs Ordered and Resulted from Time of ED Arrival to Time of ED Departure   COMPREHENSIVE METABOLIC PANEL - Abnormal       Result Value    Sodium 134 (*)     Potassium 5.4 (*)     Carbon Dioxide (CO2) 21 (*)     Anion Gap 14      Urea Nitrogen 104.5 (*)     Creatinine 2.86 (*)     GFR Estimate 18 (*)     Calcium 9.1      Chloride 99      Glucose 133 (*)     Alkaline Phosphatase 85      AST 7      ALT 13      Protein Total 6.2 (*)     Albumin 3.8      Bilirubin Total 0.2     CBC WITH PLATELETS AND DIFFERENTIAL - Abnormal    WBC Count 11.9 (*)     RBC Count 3.71 (*)     Hemoglobin 10.5 (*)     Hematocrit 34.2 (*)     MCV 92      MCH 28.3      MCHC 30.7 (*)     RDW 15.4 (*)     Platelet Count 233      % Neutrophils 59      % Lymphocytes 28      % Monocytes 7      % Eosinophils 4      % Basophils 1      % Immature Granulocytes 1      NRBCs per 100 WBC 0      Absolute Neutrophils 7.1      Absolute Lymphocytes 3.4      Absolute Monocytes 0.8      Absolute Eosinophils 0.5      Absolute Basophils 0.1      Absolute  Immature Granulocytes 0.1      Absolute NRBCs 0.0     ETHYL ALCOHOL LEVEL - Normal    Alcohol ethyl <0.01     ROUTINE UA WITH MICROSCOPIC REFLEX TO CULTURE   PHOSPHORUS   IRON AND IRON BINDING CAPACITY   FERRITIN   FRACTIONAL EXCRETION OF SODIUM   TYPE AND SCREEN, ADULT    ABO/RH(D) A POS      Antibody Screen Negative      SPECIMEN EXPIRATION DATE [id number]     ABO/RH TYPE AND SCREEN   FRACTIONAL EXCRETION OF SODIUM        CT Abdomen Pelvis w/o Contrast   Final Result   IMPRESSION:    1.  No acute process in the abdomen or pelvis.   2.  Cause for renal failure not determined on this examination.             Treatments provided: See MAR  Family Comments:  at bedside  OBS brochure/video discussed/provided to patient:  N/A  ED Medications:   Medications   acetaminophen (TYLENOL) tablet 650 mg (has no administration in time range)     Or   acetaminophen (TYLENOL) Suppository 650 mg (has no administration in time range)   melatonin tablet 3 mg (has no administration in time range)   senna-docusate (SENOKOT-S/PERICOLACE) 8.6-50 MG per tablet 1 tablet (has no administration in time range)     Or   senna-docusate (SENOKOT-S/PERICOLACE) 8.6-50 MG per tablet 2 tablet (has no administration in time range)   polyethylene glycol (MIRALAX) Packet 17 g (has no administration in time range)   bisacodyl (DULCOLAX) suppository 10 mg (has no administration in time range)   ondansetron (ZOFRAN ODT) ODT tab 4 mg (has no administration in time range)     Or   ondansetron (ZOFRAN) injection 4 mg (has no administration in time range)   sodium chloride 0.9 % infusion (has no administration in time range)   sodium chloride 0.9% BOLUS 1,000 mL (1,000 mLs Intravenous $New Bag 10/31/23 0522)       Drips infusing:  No  For the majority of the shift this patient was Green.   Interventions performed were .    Sepsis treatment initiated: No    Cares/treatment/interventions/medications to be completed following ED care:     ED Nurse  Name: Anabell Allen RN  6:32 AM     RECEIVING UNIT ED HANDOFF REVIEW    Above ED Nurse Handoff Report was reviewed: Yes  Reviewed by: Ena Trevino RN on October 31, 2023 at 6:30 PM

## 2023-10-31 NOTE — CONSULTS
Case reviewed and discussed c Dr. Gerardo.  She has already started to improve c IVF and holding lisin/hydrochlorothiazide.  The cr has decreased from 2.9 to 2 overnight.      Reviewing records, it appears that the first change in her renal fxn occurred between June and Oct 2023 when she was on lisin/hydrochlorothiazide + furos + naproxen.    If the cr doesn't return to baseline, then Dr. Gerardo will call us back.  Thank you.

## 2023-10-31 NOTE — ED TRIAGE NOTES
Arrived via EMS from home with . Has had bright red stools for the past 2 days. States has a history of blood in stools, but that she has known hemorroids which she says are currently painful 8/10.  told EMS that she has had increased weakness and increase in altered mental status but unable to clarify further what he meant by that.  PIV right AC 18g.      Triage Assessment (Adult)       Row Name 10/31/23 0414          Triage Assessment    Airway WDL WDL        Respiratory WDL    Respiratory WDL WDL        Skin Circulation/Temperature WDL    Skin Circulation/Temperature WDL WDL        Cardiac WDL    Cardiac WDL WDL        Peripheral/Neurovascular WDL    Peripheral Neurovascular WDL WDL        Cognitive/Neuro/Behavioral WDL    Cognitive/Neuro/Behavioral WDL WDL

## 2023-10-31 NOTE — PHARMACY-ADMISSION MEDICATION HISTORY
Pharmacist Admission Medication History    Admission medication history is complete. The information provided in this note is only as accurate as the sources available at the time of the update.    Information Source(s): Patient, Family member, and CareEverywhere/SureScripts via in-person    Pertinent Information: None    Changes made to PTA medication list:  Added: ALL  Deleted: Rivaroxaban  Changed: None    Medication Affordability:  Not including over the counter (OTC) medications, was there a time in the past 3 months when you did not take your medications as prescribed because of cost?: No    Allergies reviewed with patient and updates made in EHR: yes    Medication History Completed By: Darrell Paul [facility] 10/31/2023 12:06 PM    PTA Med List   Medication Sig Last Dose    acetaminophen (TYLENOL) 500 MG tablet Take 1,000 mg by mouth every 6 hours as needed for mild pain Past Week    albuterol (PROAIR HFA/PROVENTIL HFA/VENTOLIN HFA) 108 (90 Base) MCG/ACT inhaler Inhale 2 puffs into the lungs every 6 hours as needed for shortness of breath, wheezing or cough Past Month    atorvastatin (LIPITOR) 40 MG tablet Take 40 mg by mouth at bedtime 10/30/2023    cyclobenzaprine (FLEXERIL) 5 MG tablet Take 5-10 mg by mouth nightly as needed for muscle spasms 10/30/2023    DULoxetine (CYMBALTA) 60 MG capsule Take 120 mg by mouth daily 10/30/2023    fluticasone (FLONASE) 50 MCG/ACT nasal spray Spray 1 spray into both nostrils daily 10/30/2023    furosemide (LASIX) 20 MG tablet Take 20 mg by mouth 2 times daily 10/30/2023 at x2    glipiZIDE (GLUCOTROL XL) 10 MG 24 hr tablet Take 20 mg by mouth daily     lisinopril-hydrochlorothiazide (ZESTORETIC) 10-12.5 MG tablet Take 1 tablet by mouth daily 10/30/2023    meclizine (ANTIVERT) 25 MG tablet Take 25 mg by mouth 3 times daily as needed for dizziness Past Week    metFORMIN (GLUCOPHAGE) 500 MG tablet Take 1,000 mg by mouth 2 times daily (with meals) 10/30/2023    naproxen  (NAPROSYN) 500 MG tablet Take 500 mg by mouth 2 times daily (with meals) 10/30/2023    omeprazole (PRILOSEC) 20 MG DR capsule Take 20 mg by mouth daily 10/30/2023    pregabalin (LYRICA) 100 MG capsule Take 100 mg by mouth 2 times daily 10/30/2023 at x2    vitamin D3 (CHOLECALCIFEROL) 50 mcg (2000 units) tablet Take 1 tablet by mouth daily 10/30/2023

## 2023-10-31 NOTE — H&P
[facility]  Hospitalist H&P    Name: Linda Pickard      MRN: [id number]  YOB: 1955    Age: 68 year old  Date of admission: 10/31/2023  Primary care provider: Khloe Crawford            Assessment and Plan:     Linda Pickard is a 68 year old female with a history of right lower extremity DVT no longer on anticoagulation, chronic kidney disease, reactive airway disease, chronic lymphedema, neuropathy, fibromyalgia, and prior hemorrhoidal bleeding who presents with worsening confusion over the past several days.    Problem list:  Acute encephalopathy: I suspect this is primarily related to uremia.  BUN is rather high at about 104.  She has a mild tremor.  She is not somnolent or lethargic but disoriented and confused which has been worsening now for several days per the .  We will request nephrology consultation.  Acute kidney injury on chronic kidney disease: It does appear as though her creatinine has gradually been worsening over the past several months.  Somewhat unclear etiology.  It appears as though she takes furosemide, hydrochlorothiazide, and lisinopril which are likely contributing.  I believe she also takes naproxen regularly.  She also appears somewhat hypovolemic on examination.   reports that she is making adequate and normal amounts of urine.  She has not urinated here in the emergency department after 1 L of normal saline.  Interestingly, her BUN is considerably elevated beyond what I would suspect with just renal dysfunction.  Again, she does appear hypovolemic so that is likely part of the issue.  She does appear to have GI bleeding without appears to be lower GI bleeding.  She is not on steroids.  We will continue work-up by checking a fractional excretion of sodium, urinalysis, phosphorus, and iron studies.  No obstructive pathology was seen on CT scan.  We will continue normal saline at 100 cc/h and monitor her urine output and daily weights  closely.  Again, we will request nephrology consultation to assist with management.  Hyperkalemia: Likely due to renal dysfunction.  We will monitor on telemetry.  Hopefully this will improve with fluid resuscitation and improved renal function.  No shifting agents will be given at this time.  Lower GI bleeding:  reports bright red blood in the toilet.  He denies any melena.  She does have a history of hemorrhoidal bleeding.  I like to request gastroenterology consult.  She will be n.p.o. and on IV fluids for now.  To note, she is no longer on a DOAC which she was previously taking for a DVT.  History of lower extremity DVT: As above, no longer on anticoagulation.  Diabetes mellitus: Start medium sliding scale insulin and await medication reconciliation.  I believe she is on metformin and glipizide which will be on hold for the time being.  Hypertension: Hold prior to admission lisinopril and hydrochlorothiazide given her renal failure.  Neuropathy: Hold duloxetine and Lyrica given her renal injury.    Code status: Full.  Admit to inpatient status.  Prophylaxis: PCD's.  Disposition: Home in 3 to 4 days.    80 MINUTES SPENT BY ME on the date of service doing chart review, history, exam, documentation & further activities per the note.          Chief Complaint:     Confusion.         History of Present Illness:   Linda Pickard is a 68 year old female who presents with confusion.  History was obtained from my discussion with the patient and spouse at the bedside.  I also discussed the case with the ED provider.  The electronic medical record was also reviewed.    The  indicates that the patient has been gradually more confused over the past several days.  She is disoriented and repetitive.  Much of her speech is nonsensical.  He also notices a bilateral upper extremity tremor which varies in severity.  He also notes some bright red blood in the toilet after she has a bowel movement.  He denies any fevers.   She is not reporting any shortness of breath, cough or chest pain.  There is been no abdominal pain, nausea, or vomiting.  She is no longer on a blood thinner.  He reports compliance with her home medication regimen.  Due to these issues EMS was called and she was brought to the ED for evaluation.            Past Medical History:   DVT  Stage 3 CKD  Lymphedema  Hypertension  Hemorrhoids  Hammer toes, bilateral  Wheelchair bound  Reactive airway disease   Type II diabetes  Lumbar disc disease  Vascular myelopathy  Neuropathy   Prolonged QT  Vertigo  Fibromyalgia  Osteoarthritis   GI bleed  Cellulitis           Past Surgical History:   Extraocular muscle repair  Birth jennie removal  Appendectomy   section  Cholecystectomy  L4 steroid injection  Tubal ligation  Hip arthroplasty, left  Lumbar laminectomy, L4   Lumbar laminectomy, L5-S1  Knee arthroplasty, left  [place] teeth extraction  Excise ganglion cyst, right          Social History:     Social History     Tobacco Use    Smoking status: Not on file    Smokeless tobacco: Not on file   Substance Use Topics    Alcohol use: Not on file             Family History:   The family history was fully reviewed and non-contributory in this case.         Allergies:   No Known Allergies          Medications:     Prior to Admission medications    Medication Sig Last Dose Taking? Auth Provider Long Term End Date   furosemide (LASIX) 20 MG tablet Take 1 tablet (20 mg) by mouth 2 times daily   Demetrius Carrasquillo MD Yes    Rivaroxaban ANTICOAGULANT (XARELTO STARTER PACK ANTICOAGULANT) 15 & 20 MG TBPK Take 15 mg by mouth twice daily with food for 21 days followed by 20 mg by mouth once daily thereafter. Take with food. Continue medication for 6 months unless otherwise directed.   Ganga Gill MD Yes              Review of Systems:     A Comprehensive greater than 10 system review of systems was carried out.  Pertinent positives and negatives are noted above.  Otherwise  "negative for contributory information.           Physical Exam:   Blood pressure 104/86, pulse 76, temperature 97.6  F (36.4  C), temperature source Oral, SpO2 96%.  Wt Readings from Last 1 Encounters:   02/20/22 74.8 kg (165 lb)     Exam:  GENERAL: No apparent distress. Awake, alert, and somewhat oriented but confused.  HEENT: Normocephalic, atraumatic. Extraocular movements intact.  CARDIOVASCULAR: Regular rate and rhythm without murmurs or rubs. No S3.  PULMONARY: Clear to auscultation bilaterally.  ABDOMINAL: Soft, non-tender, non-distended. Bowel sounds normoactive.   EXTREMITIES: No cyanosis or clubbing. Chronic lymphedema.  NEUROLOGICAL: CN 2-12 grossly intact, no focal neurological deficits.  DERMATOLOGICAL: No rash, ulcer, bruising, nor jaundice.          Data:   EKG:  Personally reviewed.     Laboratory:  Recent Labs   Lab 10/31/23  0420   WBC 11.9*   HGB 10.5*   HCT 34.2*   MCV 92        Recent Labs   Lab 10/31/23  0420   *   POTASSIUM 5.4*   CHLORIDE 99   CO2 21*   ANIONGAP 14   *   .5*   CR 2.86*   GFRESTIMATED 18*   MIGNON 9.1     Recent Labs   Lab 10/31/23  0420   AST 7   ALT 13   ALKPHOS 85   BILITOTAL 0.2     No results for input(s): \"CULT\" in the last 168 hours.    Imaging:  Recent Results (from the past 24 hour(s))   CT Abdomen Pelvis w/o Contrast    Narrative    EXAM: CT ABDOMEN PELVIS W/O CONTRAST  LOCATION: [facility]  DATE: 10/31/2023    INDICATION: eval for acute renal faliure  COMPARISON: None.  TECHNIQUE: CT scan of the abdomen and pelvis was performed without IV contrast. Multiplanar reformats were obtained. Dose reduction techniques were used.  CONTRAST: None.    FINDINGS:   LOWER CHEST: Normal.    HEPATOBILIARY: Postcholecystectomy. Normal liver and bile ducts.    PANCREAS: Normal.    SPLEEN: Normal.    ADRENAL GLANDS: Normal.    KIDNEYS/BLADDER: Normal. No urolithiasis or hydronephrosis.    BOWEL: Normal. No obstruction or inflammation. No " free fluid or gas.    LYMPH NODES: Normal.    VASCULATURE: Mild aortoiliac atherosclerosis. No aneurysm. Slight grade 1 anterolisthesis of L4 over L5.    PELVIC ORGANS: Normal.    MUSCULOSKELETAL: Post left femoral acetabular arthroplasty. Multilevel lumbar laminectomy.      Impression    IMPRESSION:   1.  No acute process in the abdomen or pelvis.  2.  Cause for renal failure not determined on this examination.         Sherman Rubi DO MPH  [facility]  [address] [place] [address]  10/31/2023

## 2023-10-31 NOTE — ED PROVIDER NOTES
History     Chief Complaint:  Rectal Bleeding     HPI   Linda Pickard is a 68 year old female with history of type II diabetes, stage 3 CKD, hypertension, and GI bleed who presents via EMS for evaluation of rectal bleeding. Per EMS, the patient has had bright red stools for the past two days. States that she was seen in clinic yesterday, but has had increasing weakness. The patient that she has known hemorrhoids that are painful at an 8/10 on the severity scale. The patient's spouse reports the patient was hypotensive yesterday and he endorses red blood in the patient's stool. Adds that she has been having slurred speech and often does not finish her sentences which is not normal for her. Denies diarrhea and black stool.    Independent Historian:   EMS - They report as noted above.    Review of External Notes:        Medications:    Naproxen  Lancets  Cyclobenzaprine  Pregabalin  Metformin  Meclizine  Glipizide  Furosemide   Duloxetine  Omeprazole  Lisinopril-hydrochlorothiazide     Past Medical History:    DVT  Stage 3 CKD  Lymphedema  Hypertension  Hemorrhoids  Hammer toes, bilateral  Wheelchair bound  Reactive airway disease   Type II diabetes  Lumbar disc disease  Vascular myelopathy  Neuropathy   Prolonged QT  Vertigo  Fibromyalgia  Osteoarthritis   GI bleed  Cellulitis     Past Surgical History:    Extraocular muscle repair  Birth jennie removal  Appendectomy   section  Cholecystectomy  L4 steroid injection  Tubal ligation  Hip arthroplasty, left  Lumbar laminectomy, L4   Lumbar laminectomy, L5-S1  Knee arthroplasty, left  [place] teeth extraction  Excise ganglion cyst, right    Physical Exam   Patient Vitals for the past 24 hrs:   BP Temp Temp src Pulse SpO2   10/31/23 0413 104/86 -- -- -- --   10/31/23 0407 -- 97.6  F (36.4  C) Oral 76 96 %      Physical Exam  Gen: well appearing, in no acute distress  Oral : Mucous membranes moist,   Nose: No rhinorhea  Ears: External near normal, without  drainage  Eyes: periorbital tissues and sclera normal   Neck: supple, no abnormal swelling  Lungs: Clear bilaterally, no tachypnea or distress, speaks full sentences  CV: Regular rate, regular rhythm  Abd: soft, nontender, nondistended, no rebound/guarding  Ext: no lower extremity edema  Skin: warm, dry, well perfused, no rashes/bruising/lesions on exposed skin  Neuro: alert, not oriented to date  Psych: pleasant mood, normal affect    Emergency Department Course   Imaging:  CT Abdomen Pelvis w/o Contrast   Final Result   IMPRESSION:    1.  No acute process in the abdomen or pelvis.   2.  Cause for renal failure not determined on this examination.            Laboratory:  Labs Ordered and Resulted from Time of ED Arrival to Time of ED Departure   COMPREHENSIVE METABOLIC PANEL - Abnormal       Result Value    Sodium 134 (*)     Potassium 5.4 (*)     Carbon Dioxide (CO2) 21 (*)     Anion Gap 14      Urea Nitrogen 104.5 (*)     Creatinine 2.86 (*)     GFR Estimate 18 (*)     Calcium 9.1      Chloride 99      Glucose 133 (*)     Alkaline Phosphatase 85      AST 7      ALT 13      Protein Total 6.2 (*)     Albumin 3.8      Bilirubin Total 0.2     CBC WITH PLATELETS AND DIFFERENTIAL - Abnormal    WBC Count 11.9 (*)     RBC Count 3.71 (*)     Hemoglobin 10.5 (*)     Hematocrit 34.2 (*)     MCV 92      MCH 28.3      MCHC 30.7 (*)     RDW 15.4 (*)     Platelet Count 233      % Neutrophils 59      % Lymphocytes 28      % Monocytes 7      % Eosinophils 4      % Basophils 1      % Immature Granulocytes 1      NRBCs per 100 WBC 0      Absolute Neutrophils 7.1      Absolute Lymphocytes 3.4      Absolute Monocytes 0.8      Absolute Eosinophils 0.5      Absolute Basophils 0.1      Absolute Immature Granulocytes 0.1      Absolute NRBCs 0.0     ETHYL ALCOHOL LEVEL - Normal    Alcohol ethyl <0.01     ROUTINE UA WITH MICROSCOPIC REFLEX TO CULTURE   TYPE AND SCREEN, ADULT    ABO/RH(D) A POS      Antibody Screen Negative      SPECIMEN  EXPIRATION DATE [id number]     ABO/RH TYPE AND SCREEN      Emergency Department Course & Assessments:     Interventions:  Medications   sodium chloride 0.9% BOLUS 1,000 mL (1,000 mLs Intravenous $New Bag 10/31/23 0522)      Assessments:  0413 I obtained history and examined the patient as noted above.   0455 I rechecked and updated the patient. I obtained additional history from the patient's spouse as noted in the HPI.    Independent Interpretation (X-rays, CTs, rhythm strip):  CT reviewed no obvious ureterolithiasis    Consultations/Discussion of Management or Tests:  0624 I spoke with Dr. Rubi of the hospitalist service regarding admission.         Social Determinants of Health affecting care:   None    Disposition:  The patient was admitted to the hospital under the care of Dr. Rubi.     Impression & Plan    Medical Decision Making:  Patient presents with her  via EMS.   called 911 as she has been gradually getting more confused over the past couple of weeks.  He reports he has noticed couple times her hand seems to be tremoring, she will start talking at home and just cannot trail off and not finish her thoughts.  Occasionally when asked questions she seems to give nonsensical answers.  Seems to be having increased weakness and ambulating less.  No known fevers or vomiting according to the .  He has been helping to manage her and take care of her at home, he has noticed some red stools in the toilet no obvious black stools.  Reports they have known her kidneys have not been working well for some time but she has not seen a nephrologist yet.  Was supposed to be see a neurologist today for her mental status changes over the last couple of weeks.  Today it looks like she has uremic encephalopathy, no obvious infection cath UA pending.  Her hemoglobin is stable GI bleeding likely lower in nature by history does not appear to be massive.  CT shows no obvious intra-abdominal pathology.   Straight cath urine pending.  We will plan on inpatient management contacted Greyson of the hospitalist service was in agreement for admission.      Diagnosis:    ICD-10-CM    1. Uremic encephalopathy  G93.49     N19       2. Bright red blood per rectum  K62.5          Scribe Disclosure:  I, Isaura Dick, am serving as a scribe at 4:16 AM on 10/31/2023 to document services personally performed by Kevin Presley MD based on my observations and the provider's statements to me.     10/31/2023   Kevin Presley MD [place]ter, Paul Anthony, MD  10/31/23 0625

## 2023-10-31 NOTE — ED NOTES
Bed: [facility]  Expected date:   Expected time:   Means of arrival:   Comments:  BV2

## 2023-10-31 NOTE — CONSULTS
GASTROENTEROLOGY CONSULTATION      Linda Pickard  [address] [place] [address]  68 year old female     Admission Date/Time: 10/31/2023  Primary Care Provider: Ty, Khloe Atwood     We were asked to see the patient in consultation by Dr. Sherman Downs for evaluation of GIB.    CC: confusion, rectal bleeding      HPI:  Linda Pickard is a 68 year old female with history of chronic kidney disease, chronic lymphedema, neuropathy, fibromyalgia, prior hemorrhoidal bleeding, DVT no longer on anticoagulation who presents with acute confusion.  We have been consulted as  has also noticed rectal bleeding recently.  Labs on admission notable for hemoglobin 10.5, WBC 11.9.  Creatinine 2.86, .5.  CT abdomen/pelvis without contrast unremarkable.    The patient states that she has noticed some bright red blood on the outside of her stool and on the toilet paper when wiping.  This started a few weeks ago and has occurred intermittently.  With this, also notes that her hemorrhoids have been bothering her recently.  She has been using over-the-counter creams and ointments.  She has a bowel movement most days but sometimes strains and feels constipated.  Is not currently using any regular bowel regimen.  Denies any other GI symptoms.    The patient had a screening colonoscopy February 2020 that was entirely normal outside of internal hemorrhoids.  She was referred to colorectal surgery and conservative management recommended at that time due to anticoagulation.     PAST MEDICAL HISTORY:  Patient Active Problem List    Diagnosis Date Noted    Bright red blood per rectum 10/31/2023     Priority: Medium    Uremic encephalopathy 10/31/2023     Priority: Medium          ROS: A comprehensive ten point review of systems was negative aside from those in mentioned in the HPI.       MEDICATIONS:   Prior to Admission medications    Medication Sig Start Date End Date Taking? Authorizing Provider   acetaminophen  (TYLENOL) 500 MG tablet Take 1,000 mg by mouth every 6 hours as needed for mild pain   Yes Unknown, Entered By History   albuterol (PROAIR HFA/PROVENTIL HFA/VENTOLIN HFA) 108 (90 Base) MCG/ACT inhaler Inhale 2 puffs into the lungs every 6 hours as needed for shortness of breath, wheezing or cough   Yes Unknown, Entered By History   atorvastatin (LIPITOR) 40 MG tablet Take 40 mg by mouth daily   Yes Unknown, Entered By History   cyclobenzaprine (FLEXERIL) 5 MG tablet Take 5-10 mg by mouth nightly as needed for muscle spasms   Yes Unknown, Entered By History   DULoxetine (CYMBALTA) 60 MG capsule Take 120 mg by mouth daily   Yes Unknown, Entered By History   fluticasone (FLONASE) 50 MCG/ACT nasal spray Spray 1 spray into both nostrils daily   Yes Unknown, Entered By History   furosemide (LASIX) 20 MG tablet Take 20 mg by mouth 2 times daily   Yes Unknown, Entered By History   glipiZIDE (GLUCOTROL XL) 10 MG 24 hr tablet Take 20 mg by mouth daily   Yes Unknown, Entered By History   lisinopril-hydrochlorothiazide (ZESTORETIC) 10-12.5 MG tablet Take 1 tablet by mouth daily   Yes Unknown, Entered By History   metFORMIN (GLUCOPHAGE) 500 MG tablet Take 1,000 mg by mouth 2 times daily (with meals)   Yes Unknown, Entered By History   naproxen (NAPROSYN) 500 MG tablet Take 500 mg by mouth 2 times daily (with meals)   Yes Unknown, Entered By History   omeprazole (PRILOSEC) 20 MG DR capsule Take 20 mg by mouth daily   Yes Unknown, Entered By History   pregabalin (LYRICA) 100 MG capsule Take 100 mg by mouth 2 times daily   Yes Unknown, Entered By History   vitamin D3 (CHOLECALCIFEROL) 50 mcg (2000 units) tablet Take 1 tablet by mouth daily   Yes Unknown, Entered By History        ALLERGIES: No Known Allergies     SOCIAL HISTORY:        FAMILY HISTORY:  No family history on file.     PHYSICAL EXAM:   /74   Pulse 72   Temp 97.6  F (36.4  C) (Oral)   SpO2 96%      PHYSICAL EXAM:  General: alert, oriented, NAD  SKIN: no  suspicious lesions, rashes, jaundice, or spider angiomas  HEAD: Normocephalic. No masses, lesions, tenderness or abnormalities  NECK: Neck supple. No adenopathy. Thyroid symmetric, normal size.  EYES: No scleral icterus  ENT: ENT exam normal, no neck nodes or sinus tenderness  RESPIRATORY: negative, Good diaphragmatic excursion. Lungs clear  CARDIOVASCULAR: negative, PMI normal. No lifts, heaves, or thrills. RRR. No murmurs, clicks gallops or rub  GASTROINTESTINAL: +BS, soft, NT, ND, no HSM, no masses/guarding/rebound  RECTAL: several non-thrombosed external hemorrhoids noted   JOINT/EXTREMITIES: extremities normal- no gross deformities noted, gait normal and normal muscle tone  NEURO: Reflexes grossly normal and symmetric. Sensation grossly WNL.  PSYCH: no abnormal anxiety/depression  LYMPH: No anterior cervical, posterior cervical, or supraclavicular adenopathy     LABS:  I reviewed the patient's new clinical lab test results.   Recent Labs   Lab Test 10/31/23  0420 02/20/22  1325   WBC 11.9* 11.0   HGB 10.5* 10.4*   MCV 92 79    245     Recent Labs   Lab Test 10/31/23  0420 02/20/22  1325   * 135   POTASSIUM 5.4* 4.6   CHLORIDE 99 101   CO2 21* 30   .5* 35*   ANIONGAP 14 4   MIGNON 9.1 10.3*     Recent Labs   Lab Test 10/31/23  0623 10/31/23  0420 02/20/22  1325   ALBUMIN  --  3.8 3.2*   BILITOTAL  --  0.2 0.3   ALT  --  13 25   AST  --  7 16   ALKPHOS  --  85 114   PROTEIN Negative  --   --        IMAGING  I personally reviewed the patient's new imaging results.     CONSULTATION ASSESSMENT AND PLAN:    1.  BRBPR.   2.  Hemorrhoids.     This patient is a 68 year old female with history of chronic kidney disease, chronic lymphedema, neuropathy, fibromyalgia, prior hemorrhoidal bleeding, DVT (no longer on anticoagulation) who presents with acute confusion and rectal bleeding.  Only with mild, stable anemia with hemoglobin 10.5.  Iron studies near normal.  Bleeding is hemorrhoidal in nature.  There  is no need for endoscopic evaluation at this time.  If she bleeds again, would recommend colorectal surgery consult.  Otherwise could see colorectal surgery in the outpatient setting.  Discussed conservative management of hemorrhoids with the patient.    Plan  --No need for endoscopic evaluation.   --Monitor stools for bleeding, hgb.   --Consult CRS if concerns of bleeding while inpatient. Otherwise could see CRS outpatient.   --Recommended fiber supplement, Miralax as needed.     Will discuss with Dr. Colon. [facility] will sign off at this time. Please do not hesitate to reach out with any questions or concerns.     Total time spent: 45 minutes was spent providing patient care, including patient evaluation, reviewing documentation/test results, and . Thank you for asking us to participate in the care of this patient.    Edie Zhang, CNP  [facility] ([facility])  [phone]

## 2023-11-01 VITALS
TEMPERATURE: 97.4 F | OXYGEN SATURATION: 94 % | SYSTOLIC BLOOD PRESSURE: 113 MMHG | DIASTOLIC BLOOD PRESSURE: 54 MMHG | RESPIRATION RATE: 16 BRPM | HEART RATE: 81 BPM | BODY MASS INDEX: 43.8 KG/M2 | WEIGHT: 223.11 LBS | HEIGHT: 60 IN

## 2023-11-01 LAB
ANION GAP SERPL CALCULATED.3IONS-SCNC: 11 MMOL/L (ref 7–15)
BUN SERPL-MCNC: 59.4 MG/DL (ref 8–23)
CALCIUM SERPL-MCNC: 9.2 MG/DL (ref 8.8–10.2)
CHLORIDE SERPL-SCNC: 111 MMOL/L (ref 98–107)
CREAT SERPL-MCNC: 1.34 MG/DL (ref 0.51–0.95)
DEPRECATED HCO3 PLAS-SCNC: 18 MMOL/L (ref 22–29)
EGFRCR SERPLBLD CKD-EPI 2021: 44 ML/MIN/1.73M2
ERYTHROCYTE [DISTWIDTH] IN BLOOD BY AUTOMATED COUNT: 15.3 % (ref 10–15)
GLUCOSE BLDC GLUCOMTR-MCNC: 107 MG/DL (ref 70–99)
GLUCOSE BLDC GLUCOMTR-MCNC: 136 MG/DL (ref 70–99)
GLUCOSE BLDC GLUCOMTR-MCNC: 193 MG/DL (ref 70–99)
GLUCOSE SERPL-MCNC: 132 MG/DL (ref 70–99)
HCT VFR BLD AUTO: 37.4 % (ref 35–47)
HGB BLD-MCNC: 11.2 G/DL (ref 11.7–15.7)
MCH RBC QN AUTO: 28.1 PG (ref 26.5–33)
MCHC RBC AUTO-ENTMCNC: 29.9 G/DL (ref 31.5–36.5)
MCV RBC AUTO: 94 FL (ref 78–100)
PLATELET # BLD AUTO: 202 10E3/UL (ref 150–450)
POTASSIUM SERPL-SCNC: 4.3 MMOL/L (ref 3.4–5.3)
RBC # BLD AUTO: 3.98 10E6/UL (ref 3.8–5.2)
SODIUM SERPL-SCNC: 140 MMOL/L (ref 135–145)
WBC # BLD AUTO: 7.7 10E3/UL (ref 4–11)

## 2023-11-01 PROCEDURE — 36415 COLL VENOUS BLD VENIPUNCTURE: CPT | Performed by: HOSPITALIST

## 2023-11-01 PROCEDURE — 250N000012 HC RX MED GY IP 250 OP 636 PS 637: Performed by: HOSPITALIST

## 2023-11-01 PROCEDURE — 250N000013 HC RX MED GY IP 250 OP 250 PS 637: Performed by: HOSPITALIST

## 2023-11-01 PROCEDURE — 85014 HEMATOCRIT: CPT | Performed by: HOSPITALIST

## 2023-11-01 PROCEDURE — 258N000003 HC RX IP 258 OP 636: Performed by: HOSPITALIST

## 2023-11-01 PROCEDURE — 99239 HOSP IP/OBS DSCHRG MGMT >30: CPT | Performed by: HOSPITALIST

## 2023-11-01 PROCEDURE — 80048 BASIC METABOLIC PNL TOTAL CA: CPT | Performed by: HOSPITALIST

## 2023-11-01 RX ORDER — MECLIZINE HYDROCHLORIDE 25 MG/1
25 TABLET ORAL 3 TIMES DAILY PRN
Status: DISCONTINUED | OUTPATIENT
Start: 2023-11-01 | End: 2023-11-01 | Stop reason: HOSPADM

## 2023-11-01 RX ORDER — CYCLOBENZAPRINE HCL 5 MG
5 TABLET ORAL
Status: DISCONTINUED | OUTPATIENT
Start: 2023-11-01 | End: 2023-11-01 | Stop reason: HOSPADM

## 2023-11-01 RX ORDER — GLIPIZIDE 5 MG/1
20 TABLET, FILM COATED, EXTENDED RELEASE ORAL DAILY
Status: DISCONTINUED | OUTPATIENT
Start: 2023-11-01 | End: 2023-11-01 | Stop reason: HOSPADM

## 2023-11-01 RX ORDER — PREGABALIN 100 MG/1
100 CAPSULE ORAL 2 TIMES DAILY
Status: DISCONTINUED | OUTPATIENT
Start: 2023-11-01 | End: 2023-11-01 | Stop reason: HOSPADM

## 2023-11-01 RX ORDER — LISINOPRIL/HYDROCHLOROTHIAZIDE 10-12.5 MG
1 TABLET ORAL DAILY
Status: DISCONTINUED | OUTPATIENT
Start: 2023-11-01 | End: 2023-11-01 | Stop reason: HOSPADM

## 2023-11-01 RX ORDER — AMOXICILLIN 250 MG
1 CAPSULE ORAL 2 TIMES DAILY PRN
Qty: 30 TABLET | Refills: 0 | Status: SHIPPED | OUTPATIENT
Start: 2023-11-01

## 2023-11-01 RX ORDER — POLYETHYLENE GLYCOL 3350 17 G/17G
17 POWDER, FOR SOLUTION ORAL DAILY
COMMUNITY
Start: 2023-11-01

## 2023-11-01 RX ORDER — GLIPIZIDE 10 MG/1
20 TABLET, FILM COATED, EXTENDED RELEASE ORAL DAILY
COMMUNITY
Start: 2023-11-01

## 2023-11-01 RX ORDER — DULOXETIN HYDROCHLORIDE 60 MG/1
120 CAPSULE, DELAYED RELEASE ORAL DAILY
Status: DISCONTINUED | OUTPATIENT
Start: 2023-11-01 | End: 2023-11-01 | Stop reason: HOSPADM

## 2023-11-01 RX ORDER — PANTOPRAZOLE SODIUM 40 MG/1
40 TABLET, DELAYED RELEASE ORAL DAILY
Status: DISCONTINUED | OUTPATIENT
Start: 2023-11-01 | End: 2023-11-01 | Stop reason: HOSPADM

## 2023-11-01 RX ADMIN — INSULIN ASPART 1 UNITS: 100 INJECTION, SOLUTION INTRAVENOUS; SUBCUTANEOUS at 11:56

## 2023-11-01 RX ADMIN — SODIUM CHLORIDE: 900 INJECTION INTRAVENOUS at 04:59

## 2023-11-01 RX ADMIN — SENNOSIDES AND DOCUSATE SODIUM 2 TABLET: 8.6; 5 TABLET ORAL at 02:31

## 2023-11-01 RX ADMIN — ACETAMINOPHEN 650 MG: 325 TABLET, FILM COATED ORAL at 04:51

## 2023-11-01 ASSESSMENT — ACTIVITIES OF DAILY LIVING (ADL)
ADLS_ACUITY_SCORE: 42
ADLS_ACUITY_SCORE: 38
ADLS_ACUITY_SCORE: 42

## 2023-11-01 NOTE — PLAN OF CARE
Care from [id number]    Inpatient Progress Note:  For complete assessment see flow sheet documentation.    /51 (BP Location: Left arm)   Pulse 73   Temp 98.1  F (36.7  C) (Oral)   Resp 16   Ht 1.524 m (5')   Wt 101.2 kg (223 lb 1.7 oz)   SpO2 91%   BMI 43.57 kg/m         Orientation: A&O x4  Neuro: Intact  Pain status: Complained of pain 8/10 in head, Was given tylenol and repositioned and pain was relieved.   Activity: Ax 2- lift. Pt is baseline in wheelchair, complains of increased difficulty amb  Peripheral edema: See flowsheet for edema, pillows placed under legs.  Resp: Pts lungs are clear and equal bilaterally.   Cardiac: Telemetry has reported pt has SR 70's  GI: Pt complains of pain when having a BM, last BM was 11/30/2023. Has Senna, Miralax and Dulcolax ordered for stool softeners    : Pt has Purewick in place due to difficulty ambulating.   Skin: Pt complains of soreness under breasts, has some rashes present. Interdry was placed under both breasts.   LDA: PIV in right AC  Infusions: NS running at 100 mL/hr   Pertinent Labs: Q4 blood glucose checks   Diet: Renal   Consults: GI is recommending following up with colorectal surgery for hemorrhoids if they continue to bother.  Discharge Plan: Monitor AM BMP and CBC,

## 2023-11-01 NOTE — PLAN OF CARE
Goal Outcome Evaluation:      Plan of Care Reviewed With: patient, spouse    Patient's After Visit Summary was reviewed with patient and sister and spouse.   Patient verbalized understanding of After Visit Summary, recommended follow up and was given an opportunity to ask questions.   Discharge medications sent home with patient/family: YES - senna  Discharged with spouse.

## 2023-11-01 NOTE — UTILIZATION REVIEW
"  Admission Status; Secondary Review Determination         Under the authority of the Utilization Management Committee, the utilization review process indicated a secondary review on the above patient.  The review outcome is based on review of the medical records, discussions with staff, and applying clinical experience noted on the date of the review.        (X)      Inpatient Status Appropriate - This patient's medical care is consistent with medical management for inpatient care and reasonable inpatient medical practice.      () Observation Status Appropriate - This patient does not meet hospital inpatient criteria and is placed in observation status. If this patient's primary payer is Medicare and was admitted as an inpatient, Condition Code 44 should be used and patient status changed to \"observation\".   () Admission Status NOT Appropriate - This patient's medical care is not consistent with medical management for Inpatient or Observation Status.          RATIONALE FOR DETERMINATION     68 year old female with a history of right lower extremity DVT no longer on anticoagulation, chronic kidney disease, reactive airway disease, chronic lymphedema, neuropathy, fibromyalgia, and prior hemorrhoidal bleeding who presented with worsening confusion over the past several days.   Patient has an elevated BUN and creatinine and nephrology was consulted.  She also has right red blood per rectum and GI has been consulted.  Patient is receiving IV fluids and serial hemoglobins and renal function tests will be performed.    The severity of illness, intensity of service provided, expected LOS and risk for adverse outcome make the care complex, high risk and appropriate for hospital admission.        The information on this document is developed by the utilization review team in order for the business office to ensure compliance.  This only denotes the appropriateness of proper admission status and does not reflect the quality " of care rendered.         The definitions of Inpatient Status and Observation Status used in making the determination above are those provided in the CMS Coverage Manual, Chapter 1 and Chapter 6, section 70.4.      Sincerely,     Pablo Tiwari MD  Physician Advisor  Utilization Review/ Case Management  [facility].

## 2023-11-01 NOTE — PLAN OF CARE
ROOM # 202-1    Living Situation (if not independent, order SW consult):  Facility name:Home with   : Casper Pickard- - [phone]    Activity level at baseline: Ax 1 w/ Wheelchair   Activity level on admit: Lift     Who will be transporting you at discharge: - Casper     Patient registered to observation; given Patient Bill of Rights; given the opportunity to ask questions about observation status and their plan of care.  Patient has been oriented to the observation room, bathroom and call light is in place.    Telemetry placed-  SR 70's was the telemetry interpretation.     Discussed discharge goals and expectations with patient/family.

## 2023-11-01 NOTE — DISCHARGE SUMMARY
[facility]  Hospitalist Discharge Summary      Date of Admission:  10/31/2023  Date of Discharge:  11/1/2023  Discharging Provider: Shivam Vides MD  Discharge Service: Hospitalist Service    Discharge Diagnoses   Acute renal failure  Uremic encephalopathy    Clinically Significant Risk Factors     # Severe Obesity: Estimated body mass index is 43.57 kg/m  as calculated from the following:    Height as of this encounter: 1.524 m (5').    Weight as of this encounter: 101.2 kg (223 lb 1.7 oz).       Follow-ups Needed After Discharge   Follow-up Appointments     Follow-up and recommended labs and tests       Follow up with primary care provider, Khloe Crawford, within 7 days   for hospital follow- up.  The following labs/tests are recommended: chem 7   to review kidney function. Review blood pressure (review medications) and   blood sugars.            Unresulted Labs Ordered in the Past 30 Days of this Admission       No orders found for last 31 day(s).        These results will be followed up by NA    Discharge Disposition   Discharged to home  Condition at discharge: Stable    Hospital Course   Linda Pickard is a 68 year old female who presents with confusion.  History was obtained from my discussion with the patient and spouse at the bedside.  I also discussed the case with the ED provider.  The electronic medical record was also reviewed.     The  indicates that the patient has been gradually more confused over the past several days.  She is disoriented and repetitive.  Much of her speech is nonsensical.  He also notices a bilateral upper extremity tremor which varies in severity.  He also notes some bright red blood in the toilet after she has a bowel movement.  He denies any fevers.  She is not reporting any shortness of breath, cough or chest pain.  There is been no abdominal pain, nausea, or vomiting.  She is no longer on a blood thinner.  He reports compliance with her home  medication regimen.  Due to these issues EMS was called and she was brought to the ED for evaluation.    -Patient was evaluated by GI.  -Nephrology consulted, awaiting input.  -Started on renal diet.  -Repeat BMP ordered and pending.  -Continue to monitor the patient.  -Continue the same care.  I discussed with patient, with her  and also with her sister at bedside.  All their question and concerns addressed.  I will  continue to follow-up with patient.    I assumed care of the patient today.  I met with her and her .  She is at her baseline.  She has no complaints today.  She would like to discharge home.  At this point I will cut her metformin in half.  I will hold her glipizide and her lisinopril.  She should have her blood pressures checked again next week with her primary as well as her kidney function.  At that time they can decide what to do with her medications.  Her blood pressures have been stable here without her meds.    Consultations This Hospital Stay   NEPHROLOGY IP CONSULT  GASTROENTEROLOGY IP CONSULT    Code Status   Full Code    Time Spent on this Encounter     Her creatinine has returned about to her baseline.  We reviewed the medications that she should and should not be taking at home.I, Shivam Vides MD, personally saw the patient today and spent greater than 30 minutes discharging this patient.       Shivam Vides MD  [facility] OBSERVATION DEPT  [address] [place] [address]  Phone: [phone]  ______________________________________________________________________    Physical Exam   Vital Signs: Temp: 97.4  F (36.3  C) Temp src: Oral BP: 113/54 Pulse: 81   Resp: 16 SpO2: 94 % O2 Device: None (Room air)    Weight: 223 lbs 1.69 oz  Constitutional: awake, alert, cooperative, no apparent distress, and appears stated age  Eyes: Lids and lashes normal, pupils equal, round and reactive to light, extra ocular muscles intact, sclera clear,  conjunctiva normal  ENT: Normocephalic, without obvious abnormality, atraumatic, sinuses nontender on palpation, external ears without lesions, oral pharynx with moist mucous membranes, tonsils without erythema or exudates, gums normal and good dentition.  Respiratory: No increased work of breathing, good air exchange, clear to auscultation bilaterally, no crackles or wheezing  Cardiovascular: Normal apical impulse, regular rate and rhythm, normal S1 and S2, no S3 or S4, and no murmur noted  GI: No scars, normal bowel sounds, soft, non-distended, non-tender, no masses palpated, no hepatosplenomegally       Primary Care Physician   [facility]    Discharge Orders      Reason for your hospital stay    Acute renal failure  Bleeding from hemorrhoid  Confusion due to renal failure     Follow-up and recommended labs and tests     Follow up with primary care provider, Khloe Crawford, within 7 days for hospital follow- up.  The following labs/tests are recommended: chem 7 to review kidney function. Review blood pressure (review medications) and blood sugars.     Activity    Your activity upon discharge: activity as tolerated     Diet    Follow this diet upon discharge: Moderate Consistent Carb (60 g CHO per Meal) Diet       Significant Results and Procedures   Most Recent 3 CBC's:  Recent Labs   Lab Test 11/01/23  0554 10/31/23  0420 02/20/22  1325   WBC 7.7 11.9* 11.0   HGB 11.2* 10.5* 10.4*   MCV 94 92 79    233 245     Most Recent 3 BMP's:  Recent Labs   Lab Test 11/01/23  1138 11/01/23  0653 11/01/23  0554 10/31/23  1457 10/31/23  1343 10/31/23  0420   NA  --   --  140  --  133* 134*   POTASSIUM  --   --  4.3  --  4.7 5.4*   CHLORIDE  --   --  111*  --  101 99   CO2  --   --  18*  --  21* 21*   BUN  --   --  59.4*  --  85.5* 104.5*   CR  --   --  1.34*  --  1.99* 2.86*   ANIONGAP  --   --  11  --  11 14   MIGNON  --   --  9.2  --  9.2 9.1   * 136* 132*   < > 169* 133*    < > = values in this  interval not displayed.     Most Recent 2 LFT's:  Recent Labs   Lab Test 10/31/23  0420 02/20/22  1325   AST 7 16   ALT 13 25   ALKPHOS 85 114   BILITOTAL 0.2 0.3   ,   Results for orders placed or performed during the hospital encounter of 10/31/23   CT Abdomen Pelvis w/o Contrast    Narrative    EXAM: CT ABDOMEN PELVIS W/O CONTRAST  LOCATION: [facility]  DATE: 10/31/2023    INDICATION: eval for acute renal faliure  COMPARISON: None.  TECHNIQUE: CT scan of the abdomen and pelvis was performed without IV contrast. Multiplanar reformats were obtained. Dose reduction techniques were used.  CONTRAST: None.    FINDINGS:   LOWER CHEST: Normal.    HEPATOBILIARY: Postcholecystectomy. Normal liver and bile ducts.    PANCREAS: Normal.    SPLEEN: Normal.    ADRENAL GLANDS: Normal.    KIDNEYS/BLADDER: Normal. No urolithiasis or hydronephrosis.    BOWEL: Normal. No obstruction or inflammation. No free fluid or gas.    LYMPH NODES: Normal.    VASCULATURE: Mild aortoiliac atherosclerosis. No aneurysm. Slight grade 1 anterolisthesis of L4 over L5.    PELVIC ORGANS: Normal.    MUSCULOSKELETAL: Post left femoral acetabular arthroplasty. Multilevel lumbar laminectomy.      Impression    IMPRESSION:   1.  No acute process in the abdomen or pelvis.  2.  Cause for renal failure not determined on this examination.         Discharge Medications   Current Discharge Medication List        START taking these medications    Details   polyethylene glycol (MIRALAX) 17 GM/Dose powder Take 17 g by mouth daily      senna-docusate (SENOKOT-S/PERICOLACE) 8.6-50 MG tablet Take 1 tablet by mouth 2 times daily as needed for constipation  Qty: 30 tablet, Refills: 0    Associated Diagnoses: Uremic encephalopathy; Bright red blood per rectum; Acute renal failure, unspecified acute renal failure type (H24)           CONTINUE these medications which have CHANGED    Details   glipiZIDE (GLUCOTROL XL) 10 MG 24 hr tablet Take 2 tablets  (20 mg) by mouth daily Do not take until you see your doctor to review your labs again      metFORMIN (GLUCOPHAGE) 500 MG tablet Take 1 tablet (500 mg) by mouth 2 times daily (with meals)           CONTINUE these medications which have NOT CHANGED    Details   acetaminophen (TYLENOL) 500 MG tablet Take 1,000 mg by mouth every 6 hours as needed for mild pain      albuterol (PROAIR HFA/PROVENTIL HFA/VENTOLIN HFA) 108 (90 Base) MCG/ACT inhaler Inhale 2 puffs into the lungs every 6 hours as needed for shortness of breath, wheezing or cough      atorvastatin (LIPITOR) 40 MG tablet Take 40 mg by mouth at bedtime      cyclobenzaprine (FLEXERIL) 5 MG tablet Take 5-10 mg by mouth nightly as needed for muscle spasms      DULoxetine (CYMBALTA) 60 MG capsule Take 120 mg by mouth daily      fluticasone (FLONASE) 50 MCG/ACT nasal spray Spray 1 spray into both nostrils daily      meclizine (ANTIVERT) 25 MG tablet Take 25 mg by mouth 3 times daily as needed for dizziness      omeprazole (PRILOSEC) 20 MG DR capsule Take 20 mg by mouth daily      pregabalin (LYRICA) 100 MG capsule Take 100 mg by mouth 2 times daily      vitamin D3 (CHOLECALCIFEROL) 50 mcg (2000 units) tablet Take 1 tablet by mouth daily           STOP taking these medications       furosemide (LASIX) 20 MG tablet Comments:   Reason for Stopping:         lisinopril-hydrochlorothiazide (ZESTORETIC) 10-12.5 MG tablet Comments:   Reason for Stopping:         naproxen (NAPROSYN) 500 MG tablet Comments:   Reason for Stopping:             Allergies   No Known Allergies

## 2023-12-24 ENCOUNTER — HEALTH MAINTENANCE LETTER (OUTPATIENT)
Age: 68
End: 2023-12-24

## 2024-07-21 ENCOUNTER — HEALTH MAINTENANCE LETTER (OUTPATIENT)
Age: 69
End: 2024-07-21

## 2025-08-10 ENCOUNTER — HEALTH MAINTENANCE LETTER (OUTPATIENT)
Age: 70
End: 2025-08-10

## (undated) DEVICE — KIT ENDO TURNOVER/PROCEDURE W/CLEAN A SCOPE LINERS 103888

## (undated) DEVICE — ENDO TRAP POLYP QUICK CATCH 710201